# Patient Record
Sex: FEMALE
[De-identification: names, ages, dates, MRNs, and addresses within clinical notes are randomized per-mention and may not be internally consistent; named-entity substitution may affect disease eponyms.]

---

## 2018-08-08 ENCOUNTER — HOSPITAL ENCOUNTER (OUTPATIENT)
Dept: PHYSICAL THERAPY | Facility: CLINIC | Age: 68
Setting detail: THERAPIES SERIES
Discharge: HOME | End: 2018-08-08
Payer: COMMERCIAL

## 2018-08-08 PROCEDURE — 97530 THERAPEUTIC ACTIVITIES: CPT

## 2018-08-08 PROCEDURE — G8991 OTHER PT/OT GOAL STATUS: HCPCS

## 2018-08-08 PROCEDURE — G8990 OTHER PT/OT CURRENT STATUS: HCPCS

## 2018-08-08 PROCEDURE — 97161 PT EVAL LOW COMPLEX 20 MIN: CPT

## 2018-08-08 PROCEDURE — 97110 THERAPEUTIC EXERCISES: CPT

## 2018-08-17 NOTE — CONSULTS
[] Chrystine Course        Outpatient Physical                Therapy       [address]       Phone: [phone]       Fax: [phone] [] [facility]       Promotion at [address] [facility]       Phone: [phone]       Fax: [phone] [x] [facility]      [facility]    [address] [facility]     Phone: [phone]     Fax:  [phone]     Physical Therapy Pelvic Floor Evaluation    Date:  2018  Patient: Natali Villasenor  : 1950  MRN: [id number]  Physician: Dr. Sandy Arthur: [employer]($30 copay, auth visits after evaluation)  Medical Diagnosis: Anal Sphincter incontinence, Incomplete defication    Rehab Codes: M62.50, R15.9, R15.0  Onset Date: 17                                Next 's appt: PRN    Subjective:   CC:Pt is a 71 yo female who presents with complaints fecal incontinence that has been ongoing for about a year. Pt states the symptoms began as urinary urgency. She would have loss of bowel control while urinating as well as throughout the day without warning. Stools are watery and did not fel urge to defecate. She has had 3 vaginal deliveries, one complicated by vaginal tearing requiring repair. Pt has had a hysterectomy.  Pt has had full work up with GI and anorectal manometry and defecography which showed and abnormal balloon expulsion test and diminished anal sphincter tone with incontinence at rest.     HPI: (onset date:17 )      PMHx: [] Unremarkable [] Diabetes [] HTN  [] Pacemaker  [x] MI/Heart Problems [] Cancer [] Arthritis [] Asthma   [x] refer to full medical chart In [facility]  [] Other:          Tests: [] X-Ray: [] MRI:  [x] Other:      Medications: [x] Refer to full medical record [] None [] Other:  Allergies: [x] Refer to full medical record [] None [] Other:      Function: Hand Dominance [] Right [] Left  Working: [] Normal Duty [] Light Duty [] CLZ D/T Condition

## 2018-08-21 ENCOUNTER — HOSPITAL ENCOUNTER (OUTPATIENT)
Dept: PHYSICAL THERAPY | Facility: CLINIC | Age: 68
Setting detail: THERAPIES SERIES
Discharge: HOME | End: 2018-08-21
Payer: COMMERCIAL

## 2018-08-21 PROCEDURE — 97112 NEUROMUSCULAR REEDUCATION: CPT

## 2018-08-21 PROCEDURE — 97110 THERAPEUTIC EXERCISES: CPT

## 2018-09-05 ENCOUNTER — HOSPITAL ENCOUNTER (OUTPATIENT)
Dept: PHYSICAL THERAPY | Facility: CLINIC | Age: 68
Setting detail: THERAPIES SERIES
Discharge: HOME | End: 2018-09-05
Payer: COMMERCIAL

## 2018-09-05 PROCEDURE — 97112 NEUROMUSCULAR REEDUCATION: CPT

## 2018-09-05 PROCEDURE — 97110 THERAPEUTIC EXERCISES: CPT

## 2018-10-03 ENCOUNTER — HOSPITAL ENCOUNTER (OUTPATIENT)
Dept: PHYSICAL THERAPY | Facility: CLINIC | Age: 68
Setting detail: THERAPIES SERIES
Discharge: HOME | End: 2018-10-03
Payer: MEDICARE

## 2018-10-03 PROCEDURE — 97110 THERAPEUTIC EXERCISES: CPT

## 2018-10-03 PROCEDURE — 97112 NEUROMUSCULAR REEDUCATION: CPT

## 2018-10-23 ENCOUNTER — HOSPITAL ENCOUNTER (OUTPATIENT)
Dept: PHYSICAL THERAPY | Facility: CLINIC | Age: 68
Setting detail: THERAPIES SERIES
Discharge: HOME | End: 2018-10-23
Payer: MEDICARE

## 2018-10-24 ENCOUNTER — APPOINTMENT (OUTPATIENT)
Dept: PHYSICAL THERAPY | Facility: CLINIC | Age: 68
End: 2018-10-24
Payer: MEDICARE